# Patient Record
Sex: FEMALE | Race: WHITE | URBAN - METROPOLITAN AREA
[De-identification: names, ages, dates, MRNs, and addresses within clinical notes are randomized per-mention and may not be internally consistent; named-entity substitution may affect disease eponyms.]

---

## 2018-08-07 ENCOUNTER — HISTORICAL (OUTPATIENT)
Dept: ADMINISTRATIVE | Facility: HOSPITAL | Age: 83
End: 2018-08-07

## 2018-09-04 ENCOUNTER — HISTORICAL (OUTPATIENT)
Dept: ADMINISTRATIVE | Facility: HOSPITAL | Age: 83
End: 2018-09-04

## 2018-11-06 ENCOUNTER — HISTORICAL (OUTPATIENT)
Dept: ADMINISTRATIVE | Facility: HOSPITAL | Age: 83
End: 2018-11-06

## 2022-04-12 ENCOUNTER — HISTORICAL (OUTPATIENT)
Dept: ADMINISTRATIVE | Facility: HOSPITAL | Age: 87
End: 2022-04-12

## 2022-04-30 VITALS
SYSTOLIC BLOOD PRESSURE: 127 MMHG | WEIGHT: 103.63 LBS | DIASTOLIC BLOOD PRESSURE: 70 MMHG | HEIGHT: 60 IN | BODY MASS INDEX: 20.35 KG/M2

## 2022-05-05 NOTE — HISTORICAL [facility]
This is a historical note converted from Nando. Formatting and pictures may have been removed.  Please reference Nando for original formatting and attached multimedia. Chief Complaint  1 MONTH RECHECK LEFT EDIN JONES O7/20/18. SHE IS DOING WELL IN A WHEELCHAIR.  History of Present Illness  83-year-old female returns for follow-up of left hip periprosthetic fracture. ?Patient?overall doing fairly well. ?Denies any complaint of pain in the left hip. ?She is working physical therapy and slowly improving.  Review of Systems  Denies fevers, chills, chest pain, shortness of breath. Comprehensive review of systems performed and otherwise negative except as noted in HPI.  Physical Exam  Vitals & Measurements  BP:?125/70?  HT:?152?cm? HT:?152?cm? WT:?47?kg? WT:?47?kg? BMI:?20.34?  General: No acute distress, alert and oriented, healthy appearing?  HEENT: Head is atraumatic, mucous membranes are moist  Neck: Supples, no JVD  Cardiovascular: Palpable dorsalis pedis and posterior tibial pulses, regular rate and rhythm to those pulses  Lungs: Breathing non-labored  Skin: no rashes appreciated  Neurologic: Can flex and extend knees, ankles, and toes. Sensation is grossly intact  ?  Left hip:  Prescribers refill distally. ?Sensation intact distally.? Range of motion of the hip does not cause her pain. ?Incision clean and dry.  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 ?Fracture is aligned and healing well.? Will allow her to begin weightbearing to tolerance.  Ordered:  Clinic Follow up, *Est. 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 11/04/18 3:00:00 CST  ?   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  Historical  No qualifying data  Procedure/Surgical History  ORIF Femur (Left) (07/20/2018)  Reposition Left Upper Femur with Internal Fixation Device, Open Approach (07/20/2018)  Cholecystectomy  Hip replacement (left)   Medications  AMLODIPINE BESYLATE 10 MG TABS, 10 mg= 1 tab(s), Oral, Daily  aspirin 81 mg oral Delayed Release (EC) tablet, 81 mg= 1 tab(s), Oral, BID  ATORVASTATIN CALCIUM 10 MG TABS, 10 mg= 1 tab(s), Oral, Daily  Bactrim  mg-160 mg oral tablet, 1 tab(s), Oral, BID  CITALOPRAM HYDROBROMIDE 20 MG TABS, 20 mg= 1 tab(s), Oral, Daily  Iprat-Albut 0.5-3(2.5) Mg/3 Ml, 3 mL, NEB, QID, PRN  LOSARTAN POTASSIUM 100 MG TABS, 100 mg= 1 tab(s), Oral, Daily  RANITIDINE  MG TABS, 150 mg= 1 tab(s), Oral, BID  ROPINIROLE HCL .5 MG TABS, 0.5 mg= 1 tab(s), Oral, Daily  Allergies  ACE inhibitors?(unknown)  Demerol HCl?(unknown)  codeine?(unkown)  Social History  Home/Environment  Lives with [facility]., 09/04/2018  Tobacco  Family History  Family history is unknown  Health Maintenance  Health Maintenance  ???Pending?(in the next year)  ??? ??OverDue  ??? ? ? ?Coronary Artery Disease Maintenance-Antiplatelet Agent Prescribed due??and every?  ??? ? ? ?Coronary Artery Disease Maintenance-Lipid Lowering Therapy due??and every?  ??? ??Due?  ??? ? ? ?Bone Density Screening due??09/04/18??Variable frequency  ??? ? ? ?Functional Assessment due??09/04/18??and every 1??year(s)  ??? ? ? ?Pneumococcal Vaccine due??09/04/18??and every 100??year(s)  ??? ? ? ?Pneumococcal Vaccine due??09/04/18??and every?  ??? ? ? ?Tetanus Vaccine due??09/04/18??and every 10??year(s)  ??? ? ? ?Zoster Vaccine due??09/04/18??and every  100??year(s)  ??? ??Due In Future?  ??? ? ? ?Hypertension Management-BMP not due until??07/22/19??and every 1??year(s)  ??? ? ? ?Fall Risk Assessment not due until??07/24/19??and every 1??year(s)  ???Satisfied?(in the past 1 year)  ??? ??Satisfied?  ??? ? ? ?Blood Pressure Screening on??09/04/18.??Satisfied by Radha Gardner  ??? ? ? ?Body Mass Index Check on??09/04/18.??Satisfied by Radha Gardner  ??? ? ? ?Coronary Artery Disease Maintenance-Lipid Lowering Therapy on??07/23/18.??Satisfied by Shannon Chirinos RN  ??? ? ? ?Depression Screening on??09/04/18.??Satisfied by Radha Gardner  ??? ? ? ?Diabetes Screening on??07/22/18.??Satisfied by Tessa Alford  ??? ? ? ?Fall Risk Assessment on??07/24/18.??Satisfied by Shannon Chirinos RN  ??? ? ? ?Hypertension Management-Blood Pressure on??09/04/18.??Satisfied by Radha Gardner  ??? ? ? ?Obesity Screening on??09/04/18.??Satisfied by Radha Gardner  ?  ?  Diagnostic Results  AP lateral left?hip taken and reviewed. ?Patients fracture well aligned?with callus formation noted.

## 2022-05-05 NOTE — HISTORICAL [facility]
This is a historical note converted from Nando. Formatting and pictures may have been removed.  Please reference Nando for original formatting and attached multimedia. Chief Complaint  2 week post orif left femur on 7/20/18.  History of Present Illness  81 yo F presents for follow-up of ORIF L femur.? She is at rehab nad doing some standing.? Minimal ambulation currently.? Pain controlled currently.? She is having some BLE swelling, worse on the L.  Review of Systems  Denies fevers, chills, chest pain, shortness of breath, swelling.? Comprehensive review of systems performed and otherwise negative except as in HPI.  Physical Exam  Vitals & Measurements  BP:?125/70?  HT:?152?cm? HT:?152?cm? WT:?47?kg? WT:?47?kg? BMI:?20.34?  General: No acute distress, alert and oriented, healthy appearing  HEENT: Head is atraumatic, mucous membranes are moist  Neck: Supples, no JVD  Cardiovascular: Palpable dorsalis pedis and posterior tibial pulses, regular rate and rhythm to those pulses  Lungs: Breathing non-labored  Skin: no rashes appreciated  Neurologic: Can flex and extend knees, ankles, and toes.? Sensation is grossly intact  ?  L hip:  BCR distally.? incision c/d/i.? no erythema, drainage, or signs of infection.? Swelling to LLE with pitting edema to legs.?  no significant pain with logroll of hip.  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 Discontinue staples this week.? continue NWB to LLE  Ordered:  Clinic Follow up, *Est. 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 09/04/18 3:00:00 CDT, Fracture, None, Ambulatory, Rad Type, Order  for future visit, Periprosthetic fracture around internal prosthetic left hip joint, Not Scheduled, *Est. 09/04/18 3:00:00 CDT  ?   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  Historical  No qualifying data  Procedure/Surgical History  ORIF Femur (Left) (07/20/2018)  Reposition Left Upper Femur with Internal Fixation Device, Open Approach (07/20/2018)  Cholecystectomy  Hip replacement (left)   Medications  AMLODIPINE BESYLATE 10 MG TABS, 10 mg= 1 tab(s), Oral, Daily  aspirin 81 mg oral Delayed Release (EC) tablet, 81 mg= 1 tab(s), Oral, BID  ATORVASTATIN CALCIUM 10 MG TABS, 10 mg= 1 tab(s), Oral, Daily  CITALOPRAM HYDROBROMIDE 20 MG TABS, 20 mg= 1 tab(s), Oral, Daily  Iprat-Albut 0.5-3(2.5) Mg/3 Ml, 3 mL, NEB, QID, PRN  LOSARTAN POTASSIUM 100 MG TABS, 100 mg= 1 tab(s), Oral, Daily  RANITIDINE  MG TABS, 150 mg= 1 tab(s), Oral, BID  ROPINIROLE HCL .5 MG TABS, 0.5 mg= 1 tab(s), Oral, Daily  Allergies  ACE inhibitors?(unknown)  Demerol HCl?(unknown)  codeine?(unkown)  Social History  Tobacco  Health Maintenance  Health Maintenance  ???Pending?(in the next year)  ??? ??OverDue  ??? ? ? ?Coronary Artery Disease Maintenance-Antiplatelet Agent Prescribed due??and every?  ??? ? ? ?Coronary Artery Disease Maintenance-Lipid Lowering Therapy due??and every?  ??? ??Due?  ??? ? ? ?Bone Density Screening due??08/07/18??Variable frequency  ??? ? ? ?Functional Assessment due??08/07/18??and every 1??year(s)  ??? ? ? ?Hypertension Management-Blood Pressure due??08/07/18??and every?  ??? ? ? ?Pneumococcal Vaccine due??08/07/18??and every 100??year(s)  ??? ? ? ?Pneumococcal Vaccine due??08/07/18??and every?  ??? ? ? ?Tetanus Vaccine due??08/07/18??and every 10??year(s)  ??? ? ? ?Zoster Vaccine due??08/07/18??and every 100??year(s)  ??? ??Due In Future?  ??? ? ? ?Hypertension Management-BMP not due until??07/22/19??and every 1??year(s)  ??? ? ? ?Fall Risk  Assessment not due until??07/24/19??and every 1??year(s)  ???Satisfied?(in the past 1 year)  ??? ??Satisfied?  ??? ? ? ?Blood Pressure Screening on??08/07/18.??Satisfied by Radha Gardner  ??? ? ? ?Body Mass Index Check on??08/07/18.??Satisfied by Radha Gardner  ??? ? ? ?Coronary Artery Disease Maintenance-Lipid Lowering Therapy on??07/23/18.??Satisfied by Shannon Chirinos RN  ??? ? ? ?Depression Screening on??08/07/18.??Satisfied by Radha Gardner  ??? ? ? ?Diabetes Screening on??07/22/18.??Satisfied by Tessa Alford  ??? ? ? ?Fall Risk Assessment on??07/24/18.??Satisfied by Shannon Chirinos RN  ??? ? ? ?Hypertension Management-Blood Pressure on??08/07/18.??Satisfied by Radha Gardner  ??? ? ? ?Obesity Screening on??08/07/18.??Satisfied by Radha Gardner  ?  ?  Diagnostic Results  AP/Lat L hip reviewed - slight loss of reduction, but continues to be well aligned.

## 2022-05-05 NOTE — HISTORICAL [facility]ERN[facility]
This is a historical note converted from Nando. Formatting and pictures may have been removed.  Please reference Nando for original formatting and attached multimedia. Chief Complaint  Pt is here for a follow up on left femur sx. PT c/o discomfort to the hip.  History of Present Illness  83-year-old female presents today for follow-up of?left hip fracture treated with operative fixation.? Is?about 3 months out from ORIF of her femur.? She is doing some mobility but?at baseline.? Denies any complaints of pain to the left hip. ?Is having some left shoulder pain.  Review of Systems  Denies fevers, chills, chest pain, shortness of breath. Comprehensive review of systems performed and otherwise negative except as noted in HPI.  Physical Exam  Vitals & Measurements  BP:?127/70?  HT:?152?cm? HT:?152?cm? WT:?47?kg? WT:?47?kg? BMI:?20.34?  General: No acute distress, alert and oriented, healthy appearing?  HEENT: Head is atraumatic, mucous membranes are moist  Neck: Supples, no JVD  Cardiovascular: Palpable dorsalis pedis and posterior tibial pulses, regular rate and rhythm to those pulses  Lungs: Breathing non-labored  Skin: no rashes appreciated  Neurologic: Can flex and extend knees, ankles, and toes. Sensation is grossly intact  ?  Hip exam:  Left?hip incision clean dry and intact. No erythema, drainage, or signs of infection  No swelling distally. No signs of DVT  No pain with logroll  Sensation intact distally to right foot  Positive FHL/EHL/gastrocsoleus/tib ant brisk capillary refill right foot  ?  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 ?She is back to baseline from mobility standpoint.? Plan to see her back on as-needed basis.? We did discuss the current recommendations regarding antibiotic prophylaxis prior to any dental work or colonoscopies. Patient is aware of this and will contact her office should they need any  prescriptions for antibiotics called in.  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  Historical  No qualifying data  Procedure/Surgical History  ORIF Femur (Left) (07/20/2018)  Cholecystectomy  Hip replacement (left)   Medications  AMLODIPINE BESYLATE 10 MG TABS, 10 mg= 1 tab(s), Oral, Daily  aspirin 81 mg oral Delayed Release (EC) tablet, 81 mg= 1 tab(s), Oral, BID  ATORVASTATIN CALCIUM 10 MG TABS, 10 mg= 1 tab(s), Oral, Daily  Bactrim  mg-160 mg oral tablet, 1 tab(s), Oral, BID  CITALOPRAM HYDROBROMIDE 20 MG TABS, 20 mg= 1 tab(s), Oral, Daily  Iprat-Albut 0.5-3(2.5) Mg/3 Ml, 3 mL, NEB, QID, PRN  LOSARTAN POTASSIUM 100 MG TABS, 100 mg= 1 tab(s), Oral, Daily  RANITIDINE  MG TABS, 150 mg= 1 tab(s), Oral, BID  ROPINIROLE HCL .5 MG TABS, 0.5 mg= 1 tab(s), Oral, Daily  Allergies  ACE inhibitors?(unknown)  Demerol HCl?(unknown)  codeine?(unkown)  Social History  Home/Environment  Lives with [facility] 09/04/2018  Tobacco  Never smoker, N/A, 11/06/2018  Family History  Family history is unknown  Health Maintenance  Health Maintenance  ???Pending?(in the next year)  ??? ??OverDue  ??? ? ? ?Coronary Artery Disease Maintenance-Antiplatelet Agent Prescribed due??and every?  ??? ? ? ?Coronary Artery Disease Maintenance-Lipid Lowering Therapy due??and every?  ??? ??Due?  ??? ? ? ?ADL Screening due??11/06/18??and every 1??year(s)  ??? ? ? ?Bone Density Screening due??11/06/18??Variable frequency  ??? ? ? ?Cognitive Screening  due??11/06/18??and every 1??year(s)  ??? ? ? ?Functional Assessment due??11/06/18??and every 1??year(s)  ??? ? ? ?Geriatric Depression Screening due??11/06/18??and every 1??year(s)  ??? ? ? ?Pneumococcal Vaccine due??11/06/18??Variable frequency  ??? ? ? ?Pneumococcal Vaccine due??11/06/18??and every?  ??? ? ? ?Tetanus Vaccine due??11/06/18??and every 10??year(s)  ??? ? ? ?Zoster Vaccine due??11/06/18??and every 100??year(s)  ??? ??Due In Future?  ??? ? ? ?Hypertension Management-BMP not due until??07/22/19??and every 1??year(s)  ??? ? ? ?Hypertension Management-Blood Pressure not due until??09/04/19??and every 1??year(s)  ??? ? ? ?Advance Directive not due until??09/04/19??and every 1??year(s)  ???Satisfied?(in the past 1 year)  ??? ??Satisfied?  ??? ? ? ?Advance Directive on??09/04/18.??Satisfied by Radha Gardner  ??? ? ? ?Blood Pressure Screening on??11/06/18.??Satisfied by Layla Hinojosa LPN  ??? ? ? ?Body Mass Index Check on??11/06/18.??Satisfied by Layla Hinojosa LPN  ??? ? ? ?Coronary Artery Disease Maintenance-Lipid Lowering Therapy on??07/23/18.??Satisfied by Shannon Chirinos RN  ??? ? ? ?Depression Screening on??11/06/18.??Satisfied by Layla Hinojosa LPN  ??? ? ? ?Diabetes Screening on??07/22/18.??Satisfied by Tessa Alford  ??? ? ? ?Fall Risk Assessment on??11/06/18.??Satisfied by Layla Hinojosa LPN  ??? ? ? ?Hypertension Management-Blood Pressure on??11/06/18.??Satisfied by Layla Hinojosa LPN  ??? ? ? ?Obesity Screening on??11/06/18.??Satisfied by Layla Hinojosa LPN  ?  ?  Diagnostic Results  AP lateral left hip taken and reviewed. ?Patients fracture well aligned. ?Does appear to have callus formation.